# Patient Record
Sex: MALE | Race: BLACK OR AFRICAN AMERICAN
[De-identification: names, ages, dates, MRNs, and addresses within clinical notes are randomized per-mention and may not be internally consistent; named-entity substitution may affect disease eponyms.]

---

## 2020-09-09 ENCOUNTER — HOSPITAL ENCOUNTER (INPATIENT)
Dept: HOSPITAL 74 - YASAS | Age: 35
LOS: 2 days | Discharge: LEFT BEFORE DISCHARGE | DRG: 770 | End: 2020-09-11
Attending: ALLERGY & IMMUNOLOGY | Admitting: ALLERGY & IMMUNOLOGY
Payer: COMMERCIAL

## 2020-09-09 VITALS — BODY MASS INDEX: 28.7 KG/M2

## 2020-09-09 DIAGNOSIS — Z59.0: ICD-10-CM

## 2020-09-09 DIAGNOSIS — F11.23: ICD-10-CM

## 2020-09-09 DIAGNOSIS — F10.230: Primary | ICD-10-CM

## 2020-09-09 DIAGNOSIS — F12.20: ICD-10-CM

## 2020-09-09 DIAGNOSIS — F17.210: ICD-10-CM

## 2020-09-09 LAB
ALBUMIN SERPL-MCNC: 3.6 G/DL (ref 3.4–5)
ALP SERPL-CCNC: 73 U/L (ref 45–117)
ALT SERPL-CCNC: 22 U/L (ref 13–61)
ANION GAP SERPL CALC-SCNC: 7 MMOL/L (ref 8–16)
AST SERPL-CCNC: 16 U/L (ref 15–37)
BILIRUB SERPL-MCNC: 0.4 MG/DL (ref 0.2–1)
BUN SERPL-MCNC: 8.1 MG/DL (ref 7–18)
CALCIUM SERPL-MCNC: 8.8 MG/DL (ref 8.5–10.1)
CHLORIDE SERPL-SCNC: 104 MMOL/L (ref 98–107)
CO2 SERPL-SCNC: 32 MMOL/L (ref 21–32)
CREAT SERPL-MCNC: 0.8 MG/DL (ref 0.55–1.3)
DEPRECATED RDW RBC AUTO: 12.4 % (ref 11.9–15.9)
GLUCOSE SERPL-MCNC: 74 MG/DL (ref 74–106)
HCT VFR BLD CALC: 35.7 % (ref 35.4–49)
HGB BLD-MCNC: 11.8 GM/DL (ref 11.7–16.9)
MCH RBC QN AUTO: 29.4 PG (ref 25.7–33.7)
MCHC RBC AUTO-ENTMCNC: 33.1 G/DL (ref 32–35.9)
MCV RBC: 88.8 FL (ref 80–96)
PLATELET # BLD AUTO: 180 K/MM3 (ref 134–434)
PMV BLD: 10.5 FL (ref 7.5–11.1)
POTASSIUM SERPLBLD-SCNC: 3.6 MMOL/L (ref 3.5–5.1)
PROT SERPL-MCNC: 6.6 G/DL (ref 6.4–8.2)
RBC # BLD AUTO: 4.03 M/MM3 (ref 4–5.6)
SODIUM SERPL-SCNC: 142 MMOL/L (ref 136–145)
WBC # BLD AUTO: 4.1 K/MM3 (ref 4–10)

## 2020-09-09 PROCEDURE — U0003 INFECTIOUS AGENT DETECTION BY NUCLEIC ACID (DNA OR RNA); SEVERE ACUTE RESPIRATORY SYNDROME CORONAVIRUS 2 (SARS-COV-2) (CORONAVIRUS DISEASE [COVID-19]), AMPLIFIED PROBE TECHNIQUE, MAKING USE OF HIGH THROUGHPUT TECHNOLOGIES AS DESCRIBED BY CMS-2020-01-R: HCPCS

## 2020-09-09 PROCEDURE — HZ2ZZZZ DETOXIFICATION SERVICES FOR SUBSTANCE ABUSE TREATMENT: ICD-10-PCS | Performed by: ALLERGY & IMMUNOLOGY

## 2020-09-09 RX ADMIN — Medication SCH MG: at 22:32

## 2020-09-09 RX ADMIN — HYDROXYZINE PAMOATE SCH: 25 CAPSULE ORAL at 18:02

## 2020-09-09 RX ADMIN — HYDROXYZINE PAMOATE SCH MG: 25 CAPSULE ORAL at 15:45

## 2020-09-09 RX ADMIN — Medication SCH MG: at 22:33

## 2020-09-09 RX ADMIN — HYDROXYZINE PAMOATE SCH MG: 25 CAPSULE ORAL at 22:33

## 2020-09-09 RX ADMIN — NICOTINE SCH: 14 PATCH, EXTENDED RELEASE TRANSDERMAL at 15:45

## 2020-09-09 SDOH — ECONOMIC STABILITY - HOUSING INSECURITY: HOMELESSNESS: Z59.0

## 2020-09-09 NOTE — BHS.RME
Substance Use & Tx History





- Substance Use History


  ** Alcohol


Substance amount: 1 pint vodka 


Frequency of use: Less than 3 times per week


Substance route: Oral


Date of Last Use: 20





  ** Heroin


Substance amount: 4 bags


Frequency of use: Daily


Substance route: Inhalation (ex: sniffing or snorting)


Date of Last Use: 20





  ** Marijuana/Hashish


Substance amount: 1 joint 


Frequency of use: Daily


Substance route: Smoking


Date of Last Use: 20





  ** Nicotine


Substance amount: 1/2 pack


Frequency of use: Daily


Substance route: Smoking


Date of Last Use: 20





Physical/Psych/Mental Status





- Behavior


General Behavior: Increased activity (restlessness, agitation)


Eye Contact: Normal





- Cooperativeness


Cooperativeness: Cooperative





- Thinking


Thought Processes: Tight, Logical, Goal Directed





- Physical Health Problems


Is patient presently having any pain?: No


Does patient presently have any injuries (include location): No


Does patient currently have a fever: No


Is patient pregnant: No





COWS





- Scale


Resting Pulse: 0= [place] 80 or Below


Sweatin= Chills/Flushing


Restless Observation: 1= Difficult to Sit Still


Pupil Size: 1= Pupils >than Normal


Bone or Joint Aches: 2= Severe Diffuse Aches


Runny Nose/ Eye Tearin= Runny Nose/Eyes


GI Upset > 30mins: 1= Stomach Cramp


Tremor Observation: 1= Tremor Felt, Not Seen


Yawning Observation: 0= None


Anxiety or Irritability: 1=Feels Anxious/Irritable


Goose Flesh Skin: 3=Piloerection


COWS Score: 13





CIWA


Nausea/Vomitin-No Nausea/No Vomiting


Muscle Tremors: 3


Anxiety: 3


Agitation: 3


Paroxysmal Sweats: 4-Forehead w/Sweat Beads


Orientation: 0-Oriented


Tacttile Disturbances: 0-None


Auditory Disturbances: 0-None


Visual Disturbances: 0-None


Headache: 0-None Present


CIWA-Ar Total Score: 13

## 2020-09-09 NOTE — HP
COWS





- Scale


Resting Pulse: 0= [place] 80 or Below


Sweatin= Chills/Flushing


Restless Observation: 1= Difficult to Sit Still


Pupil Size: 1= Pupils >than Normal


Bone or Joint Aches: 2= Severe Diffuse Aches


Runny Nose/ Eye Tearin= Runny Nose/Eyes


GI Upset > 30mins: 1= Stomach Cramp


Tremor Observation: 1= Tremor Felt, Not Seen


Yawning Observation: 0= None


Anxiety or Irritability: 1=Feels Anxious/Irritable


Goose Flesh Skin: 3=Piloerection


COWS Score: 13





CIWA Score


Nausea/Vomitin-No Nausea/No Vomiting


Muscle Tremors: 3


Anxiety: 3


Agitation: 3


Paroxysmal Sweats: 4-Forehead w/Sweat Beads


Orientation: 0-Oriented


Tacttile Disturbances: 0-None


Auditory Disturbances: 0-None


Visual Disturbances: 0-None


Headache: 0-None Present


CIWA-Ar Total Score: 13





- Admission Criteria


[facility] Guidelines: Admission for Medically Managed Detox: 


Requires at least one of the followin. CIWA greater than 12


2. Seizures within the past 24 hours


3. Delirium tremens within the past 24 hours


4. Hallucinations within the past 24 hours


5. Acute intervention needed for co  occurring medical disorder


6. Acute intervention needed for co  occurring psychiatric disorder


7. Severe withdrawal that cannot be handled at a lower level of care (continued


    vomiting, continued diarrhea, abnormal vital signs) requiring intravenous


    medication and/or fluids


8. Pregnancy








Admitting History and Physical





- Admission


Chief Complaint: Mr. Tatum presents to [facility] stating he is here "to get 

clean".  He requests admission to detox for alcohol and heroin use.


History of Present Illness: 





Mr. Tatum presents to [facility] stating he is here "to get clean".  He requests

admission to detox for alcohol and heroin use. 


He was last here for a 2 day detox in 2017.





PMH: eczema


PSH/Psych/Legal: none


SOC: homeless, with friends








Substance Use History


  ** Alcohol


Substance amount: 1 pint vodka 


Frequency of use: Less than 3 times per week


Substance route: Oral


Date of Last Use: 20


Began age 10 y.


No deizures, no blackouts, no eyeopener





  ** Heroin


Substance amount: 4 bags


Frequency of use: Daily


Substance route: Inhalation (ex: sniffing or snorting)


Date of Last Use: 20


Began at age 29 y


No OD


No Narcan at home





  ** Marijuana/Hashish


Substance amount: 1 joint 


Frequency of use: Daily


Substance route: Smoking


Date of Last Use: 20


Began age 9 y





  ** Nicotine


Substance amount: 1/2 pack


Frequency of use: Daily


Substance route: Smoking


Date of Last Use: 20


Began age 9 yo





Benzo: denies


Methadone: buys on street


History Source: Patient


Limitations to Obtaining History: No Limitations





- Smoking History


Smoking history: Current every day smoker


Have you smoked in the past 12 months: Yes


Aproximately how many cigarettes per day: 20





- Alcohol/Substance Use


Hx Alcohol Use: No





- Social History


Usual Living Arrangement: Yes: Other (homeless, with friends)





Admission [facility]


Allergies/Adverse Reactions: 


                                    Allergies











Allergy/AdvReac Type Severity Reaction Status Date / Time


 


No Known Allergies Allergy   Verified 17 14:28











Exam Limitations: No Limitations





- Ebola screening


Have you traveled outside of the country in the last 21 days: No


Have you been sick,other than usual withdrawal symptoms: No


Do you have a fever: No





- Review of Systems


Constitutional: No Symptoms Reported


EENT: reports: Nose Congestion


Respiratory: reports: No Symptoms reported


Cardiac: reports: No Symptoms Reported


GI: reports: Nausea


: reports: No Symptoms Reported


Musculoskeletal: reports: Back Pain


Integumentary: reports: No Symptoms Reported


Neuro: reports: Tremors


Hematology: reports: No Symptoms Reported


Psychiatric: reports: Anxious





Patient History





- Patient Medical History


Hx Anemia: No


Hx Asthma: No


Hx Chronic Obstructive Pulmonary Disease (COPD): No


Hx Cancer: No


Hx Cardiac Disorders: No


Hx Congestive Heart Failure: No


Hx Hypertension: No


Hx Hypercholesterolemia: No


Hx Pacemaker: No


HX Cerebrovascular Accident: No


Hx Seizures: No


Hx Dementia: No


Hx Diabetes: No


Hx Gastrointestinal Disorders: No


Hx Liver Disease: No


Hx Genitourinary Disorders: No


Hx Sexually Transmitted Disorders: No


Hx Renal Disease (ESRD): No


Hx Thyroid Disease: No


Hx Human Immunodeficiency Virus (HIV): No


Hx Hepatitis C: No


Hx Depression: Yes


Hx Suicide Attempt: No


Hx Bipolar Disorder: No


Hx Schizophrenia: No





- Patient Surgical History


Past Surgical History: No


Anesthesia Reaction: No





- Smoking Cessation


Smoking history: Current every day smoker


Aproximately how many cigarettes per day: 10


Hx Chewing Tobacco Use: No


Initiated information on smoking cessation: Yes


'Breaking Loose' booklet given: 20





Admission Physical Exam [facility]





- Physical


General Appearance: Yes: No Apparent Distress, Nourished, Appropriately Dressed


HEENTM: Yes: EOMI, Hearing grossly Normal, Normocephalic, Normal Voice


Respiratory: Yes: Lungs Clear, No Respiratory Distress, No Accessory Muscle Use


Neck: Yes: Within Normal Limits, Supple


Breast: Yes: Breast Exam Deferred


Cardiology: Yes: Regular Rhythm, Regular Rate, S1, S2


Abdominal: Yes: Normal Bowel Sounds, Non Tender, Flat, Soft


Genitourinary: Yes: Other (deferred)


Back: Yes: Normal Inspection


Musculoskeletal: Yes: full range of Motion


Extremities: Yes: Normal Inspection, Non-Tender


Neurological: Yes: Alert, Normal Response


Integumentary: Yes: Within Normal Limits





- Diagnostic


(1) Alcohol dependence with withdrawal, uncomplicated


Current Visit: Yes   Status: Acute   





(2) Cannabis abuse


Current Visit: Yes   Status: Acute   





(3) Nicotine dependence


Current Visit: Yes   Status: Acute   


Qualifiers: 


   Nicotine product type: cigarettes   Substance use status: uncomplicated   

Qualified Code(s): F17.210 - Nicotine dependence, cigarettes, uncomplicated   





(4) Opioid dependence with withdrawal


Current Visit: Yes   Status: Acute   





Cleared for Admission [facility]





- Detox or Rehab


[facility] Level of Care: Medically Managed


Detox Regimen/Protocol: Methadone/Librium





Breathalyzer





- Breathalyzer


Breathalyzer: 0





Urine Drug Screen





- Test Device


Lot number: [id number]


Expiration date: 22





- Control


Is test valid?: Yes





- Results


Drug screen NEGATIVE: No


Urine drug screen results: THC-Marijuana, FEN-Fentanyl, MOP-Opiates, MTD-

Methadone, BZO-Benzodiazepines





Inpatient Rehab Admission





- Rehab Decision to Admit


Inpatient rehab admission?: No

## 2020-09-10 RX ADMIN — Medication SCH: at 23:16

## 2020-09-10 RX ADMIN — Medication SCH: at 22:26

## 2020-09-10 RX ADMIN — HYDROXYZINE PAMOATE SCH: 25 CAPSULE ORAL at 06:18

## 2020-09-10 RX ADMIN — HYDROXYZINE PAMOATE SCH: 25 CAPSULE ORAL at 14:51

## 2020-09-10 RX ADMIN — HYDROXYZINE PAMOATE SCH: 25 CAPSULE ORAL at 10:39

## 2020-09-10 RX ADMIN — HYDROXYZINE PAMOATE SCH MG: 25 CAPSULE ORAL at 17:34

## 2020-09-10 RX ADMIN — Medication SCH APPLIC: at 17:36

## 2020-09-10 RX ADMIN — Medication SCH TAB: at 10:38

## 2020-09-10 RX ADMIN — HYDROXYZINE PAMOATE SCH: 25 CAPSULE ORAL at 22:26

## 2020-09-10 RX ADMIN — NICOTINE SCH: 14 PATCH, EXTENDED RELEASE TRANSDERMAL at 10:39

## 2020-09-10 NOTE — PN
S CIWA





- CIWA Score


Nausea/Vomitin-Mild Nausea/No Vomiting


Muscle Tremors: 2


Anxiety: 2


Agitation: 2


Paroxysmal Sweats: 1-Minimal Palms Moist


Orientation: 0-Oriented


Tacttile Disturbances: 1-Very Mild Itch/Numbness


Auditory Disturbances: 0-None


Visual Disturbances: 0-None


Headache: 2-Mild


CIWA-Ar Total Score: 11





BHS COWS





- Scale


Resting Pulse: 0= [place] 80 or Below


Sweatin= No chills or Flushing


Restless Observation: 0= Sits Still


Pupil Size: 1= Pupils >than Normal


Bone or Joint Aches: 2= Severe Diffuse Aches


Runny Nose/ Eye Tearin= Runny Nose/Eyes


GI Upset > 30mins: 2= Nausea/Diarrhea


Tremor Observation of Outstretched Hands: 2= Slight Tremor Visible


Yawning Observation: 1= 1-2x During Session


Anxiety or Irritability: 2=Irritable/Anxious


Goose Flesh Skin: 0=Smooth Skin


COWS Score: 12





BHS Progress Note (SOAP)


Subjective: 





alert,irritable,anxious,interrupted sleep,tremor,aching pain body and 

back,nausea


Objective: 





09/10/20 12:45


                                   Vital Signs











Temperature  97.8 F   09/10/20 08:46


 


Pulse Rate  54 L  09/10/20 08:46


 


Respiratory Rate  16   09/10/20 08:46


 


Blood Pressure  133/79   09/10/20 08:46


 


O2 Sat by Pulse Oximetry (%)  99   09/10/20 06:20








                             Laboratory Last Values











WBC  4.1 K/mm3 (4.0-10.0)   20  12:50    


 


RBC  4.03 M/mm3 (4.00-5.60)   20  12:50    


 


Hgb  11.8 GM/dL (11.7-16.9)   20  12:50    


 


Hct  35.7 % (35.4-49)  D 20  12:50    


 


MCV  88.8 fl (80-96)   20  12:50    


 


MCH  29.4 pg (25.7-33.7)   20  12:50    


 


MCHC  33.1 g/dl (32.0-35.9)   20  12:50    


 


RDW  12.4 % (11.9-15.9)   20  12:50    


 


Plt Count  180 K/MM3 (134-434)  D 20  12:50    


 


MPV  10.5 fl (7.5-11.1)   20  12:50    


 


Sodium  142 mmol/L (136-145)   20  12:50    


 


Potassium  3.6 mmol/L (3.5-5.1)   20  12:50    


 


Chloride  104 mmol/L ()   20  12:50    


 


Carbon Dioxide  32 mmol/L (21-32)   20  12:50    


 


Anion Gap  7 MMOL/L (8-16)  L  20  12:50    


 


BUN  8.1 mg/dL (7-18)   20  12:50    


 


Creatinine  0.8 mg/dL (0.55-1.3)   20  12:50    


 


Est GFR (CKD-EPI)AfAm  134.14   20  12:50    


 


Est GFR (CKD-EPI)NonAf  115.74   20  12:50    


 


Random Glucose  74 mg/dL ()   20  12:50    


 


Calcium  8.8 mg/dL (8.5-10.1)   20  12:50    


 


Total Bilirubin  0.4 mg/dL (0.2-1)   20  12:50    


 


AST  16 U/L (15-37)   20  12:50    


 


ALT  22 U/L (13-61)   20  12:50    


 


Alkaline Phosphatase  73 U/L ()   20  12:50    


 


Total Protein  6.6 g/dl (6.4-8.2)   20  12:50    


 


Albumin  3.6 g/dl (3.4-5.0)   20  12:50    


 


Syphilis Serology  Non-reactive  (NONREACTIVE)   20  12:50    











Assessment: 





09/10/20 12:46


withdrawal symptom


Plan: 





continue detox methadone and librium regimen,fluid

## 2020-09-11 VITALS — SYSTOLIC BLOOD PRESSURE: 122 MMHG | DIASTOLIC BLOOD PRESSURE: 76 MMHG | HEART RATE: 49 BPM | TEMPERATURE: 97.3 F

## 2020-09-11 RX ADMIN — Medication SCH: at 12:17

## 2020-09-11 RX ADMIN — HYDROXYZINE PAMOATE SCH: 25 CAPSULE ORAL at 08:14

## 2020-09-11 RX ADMIN — Medication SCH TAB: at 10:17

## 2020-09-11 RX ADMIN — HYDROXYZINE PAMOATE SCH: 25 CAPSULE ORAL at 14:17

## 2020-09-11 RX ADMIN — Medication SCH: at 08:15

## 2020-09-11 RX ADMIN — NICOTINE SCH: 14 PATCH, EXTENDED RELEASE TRANSDERMAL at 10:17

## 2020-09-11 RX ADMIN — HYDROXYZINE PAMOATE SCH: 25 CAPSULE ORAL at 10:17

## 2020-09-11 NOTE — DS
BHS Detox Discharge Summary


Admission Date: 


09/09/20





Discharge Date: 09/11/20





- History


Additional Comments: 





Patient left against medical advice. He had left prior to assessment and as per 

the nurse he refused to sign the AMA form


Pertinent Past History: 





Opioid dependence


Alcohol dependence


Nicotine dependence


Cannabis dependence





- Physical Exam Results


Vital Signs: 


                                   Vital Signs











Temperature  97.3 F L  09/11/20 12:40


 


Pulse Rate  49 L  09/11/20 12:40


 


Respiratory Rate  18   09/11/20 12:40


 


Blood Pressure  122/76   09/11/20 12:40


 


O2 Sat by Pulse Oximetry (%)  97   09/11/20 12:40








                             Laboratory Last Values











WBC  4.1 K/mm3 (4.0-10.0)   09/09/20  12:50    


 


RBC  4.03 M/mm3 (4.00-5.60)   09/09/20  12:50    


 


Hgb  11.8 GM/dL (11.7-16.9)   09/09/20  12:50    


 


Hct  35.7 % (35.4-49)  D 09/09/20  12:50    


 


MCV  88.8 fl (80-96)   09/09/20  12:50    


 


MCH  29.4 pg (25.7-33.7)   09/09/20  12:50    


 


MCHC  33.1 g/dl (32.0-35.9)   09/09/20  12:50    


 


RDW  12.4 % (11.9-15.9)   09/09/20  12:50    


 


Plt Count  180 K/MM3 (134-434)  D 09/09/20  12:50    


 


MPV  10.5 fl (7.5-11.1)   09/09/20  12:50    


 


Sodium  142 mmol/L (136-145)   09/09/20  12:50    


 


Potassium  3.6 mmol/L (3.5-5.1)   09/09/20  12:50    


 


Chloride  104 mmol/L ()   09/09/20  12:50    


 


Carbon Dioxide  32 mmol/L (21-32)   09/09/20  12:50    


 


Anion Gap  7 MMOL/L (8-16)  L  09/09/20  12:50    


 


BUN  8.1 mg/dL (7-18)   09/09/20  12:50    


 


Creatinine  0.8 mg/dL (0.55-1.3)   09/09/20  12:50    


 


Est GFR (CKD-EPI)AfAm  134.14   09/09/20  12:50    


 


Est GFR (CKD-EPI)NonAf  115.74   09/09/20  12:50    


 


Random Glucose  74 mg/dL ()   09/09/20  12:50    


 


Calcium  8.8 mg/dL (8.5-10.1)   09/09/20  12:50    


 


Total Bilirubin  0.4 mg/dL (0.2-1)   09/09/20  12:50    


 


AST  16 U/L (15-37)   09/09/20  12:50    


 


ALT  22 U/L (13-61)   09/09/20  12:50    


 


Alkaline Phosphatase  73 U/L ()   09/09/20  12:50    


 


Total Protein  6.6 g/dl (6.4-8.2)   09/09/20  12:50    


 


Albumin  3.6 g/dl (3.4-5.0)   09/09/20  12:50    


 


Syphilis Serology  Non-reactive  (NONREACTIVE)   09/09/20  12:50    


 


COVID-19 (LEXI)  Not detected  (Not Detected)   09/09/20  15:00    











Pertinent Admission Physical Exam Findings: 





Opioid withdrawal symptoms


Alcohol withdrawal  symptoms





- Medication


Discharge Medications: 


Ambulatory Orders





NK [No Known Home Medication]  11/17/17 











- Diagnosis


(1) Alcohol dependence with withdrawal, uncomplicated


Current Visit: Yes   Status: Chronic   





(2) Cannabis abuse


Current Visit: Yes   Status: Chronic   





(3) Nicotine dependence


Current Visit: Yes   Status: Chronic   


Qualifiers: 


   Nicotine product type: cigarettes   Substance use status: uncomplicated   

Qualified Code(s): F17.210 - Nicotine dependence, cigarettes, uncomplicated   





(4) Opioid dependence with withdrawal


Current Visit: Yes   Status: Chronic   





- AMA


Did Patient Leave Against Medical Advice: Yes

## 2020-09-11 NOTE — PN
[facility] CIWA





- CIWA Score


Nausea/Vomitin-Mild Nausea/No Vomiting


Muscle Tremors: 2


Anxiety: 2


Agitation: 2


Paroxysmal Sweats: No Perspiration


Orientation: 0-Oriented


Tacttile Disturbances: 1-Very Mild Itch/Numbness


Auditory Disturbances: 0-None


Visual Disturbances: 0-None


Headache: 1-Very Mild


CIWA-Ar Total Score: 9





BHS COWS





- Scale


Resting Pulse: 0= [place] 80 or Below


Sweatin= No chills or Flushing


Restless Observation: 0= Sits Still


Pupil Size: 1= Pupils >than Normal


Bone or Joint Aches: 2= Severe Diffuse Aches


Runny Nose/ Eye Tearin= Nasal Congestion


GI Upset > 30mins: 1= Stomach Cramp


Tremor Observation of Outstretched Hands: 2= Slight Tremor Visible


Yawning Observation: 1= 1-2x During Session


Anxiety or Irritability: 2=Irritable/Anxious


Goose Flesh Skin: 0=Smooth Skin


COWS Score: 10





BHS Progress Note (SOAP)


Subjective: 





alert,irritable,anxious,interrupted sleep,tremor,nausea


Objective: 





20 14:46


                                   Vital Signs











Temperature  97.3 F L  20 12:40


 


Pulse Rate  49 L  20 12:40


 


Respiratory Rate  18   20 12:40


 


Blood Pressure  122/76   20 12:40


 


O2 Sat by Pulse Oximetry (%)  97   20 12:40











20 14:46


                             Laboratory Last Values











WBC  4.1 K/mm3 (4.0-10.0)   20  12:50    


 


RBC  4.03 M/mm3 (4.00-5.60)   20  12:50    


 


Hgb  11.8 GM/dL (11.7-16.9)   20  12:50    


 


Hct  35.7 % (35.4-49)  D 20  12:50    


 


MCV  88.8 fl (80-96)   20  12:50    


 


MCH  29.4 pg (25.7-33.7)   20  12:50    


 


MCHC  33.1 g/dl (32.0-35.9)   20  12:50    


 


RDW  12.4 % (11.9-15.9)   20  12:50    


 


Plt Count  180 K/MM3 (134-434)  D 20  12:50    


 


MPV  10.5 fl (7.5-11.1)   20  12:50    


 


Sodium  142 mmol/L (136-145)   20  12:50    


 


Potassium  3.6 mmol/L (3.5-5.1)   20  12:50    


 


Chloride  104 mmol/L ()   20  12:50    


 


Carbon Dioxide  32 mmol/L (21-32)   20  12:50    


 


Anion Gap  7 MMOL/L (8-16)  L  20  12:50    


 


BUN  8.1 mg/dL (7-18)   20  12:50    


 


Creatinine  0.8 mg/dL (0.55-1.3)   20  12:50    


 


Est GFR (CKD-EPI)AfAm  134.14   20  12:50    


 


Est GFR (CKD-EPI)NonAf  115.74   20  12:50    


 


Random Glucose  74 mg/dL ()   20  12:50    


 


Calcium  8.8 mg/dL (8.5-10.1)   20  12:50    


 


Total Bilirubin  0.4 mg/dL (0.2-1)   20  12:50    


 


AST  16 U/L (15-37)   20  12:50    


 


ALT  22 U/L (13-61)   20  12:50    


 


Alkaline Phosphatase  73 U/L ()   20  12:50    


 


Total Protein  6.6 g/dl (6.4-8.2)   20  12:50    


 


Albumin  3.6 g/dl (3.4-5.0)   20  12:50    


 


Syphilis Serology  Non-reactive  (NONREACTIVE)   20  12:50    


 


COVID-19 (LEXI)  Not detected  (Not Detected)   20  15:00    











Assessment: 





20 14:59


withdrawal symptom


Plan: 





continue detox methadone and librium regimen

## 2022-09-26 ENCOUNTER — HOSPITAL ENCOUNTER (EMERGENCY)
Facility: HOSPITAL | Age: 37
Discharge: HOME | End: 2022-09-26
Attending: EMERGENCY MEDICINE
Payer: MEDICAID

## 2022-09-26 VITALS
DIASTOLIC BLOOD PRESSURE: 68 MMHG | TEMPERATURE: 97.9 F | SYSTOLIC BLOOD PRESSURE: 132 MMHG | OXYGEN SATURATION: 100 % | RESPIRATION RATE: 18 BRPM | HEART RATE: 72 BPM

## 2022-09-26 DIAGNOSIS — F11.93 OPIATE WITHDRAWAL (HCC): Primary | ICD-10-CM

## 2022-09-26 LAB
ALBUMIN SERPL BCP-MCNC: 4.4 G/DL (ref 3.5–5)
ALP SERPL-CCNC: 62 U/L (ref 46–116)
ALT SERPL W P-5'-P-CCNC: 22 U/L (ref 12–78)
AMPHETAMINES SERPL QL SCN: NEGATIVE
ANION GAP SERPL CALCULATED.3IONS-SCNC: 9 MMOL/L (ref 4–13)
AST SERPL W P-5'-P-CCNC: 14 U/L (ref 5–45)
BARBITURATES UR QL: NEGATIVE
BASOPHILS # BLD AUTO: 0.02 THOUSANDS/ΜL (ref 0–0.1)
BASOPHILS NFR BLD AUTO: 0 % (ref 0–1)
BENZODIAZ UR QL: NEGATIVE
BILIRUB SERPL-MCNC: 0.92 MG/DL (ref 0.2–1)
BUN SERPL-MCNC: 7 MG/DL (ref 5–25)
CALCIUM SERPL-MCNC: 9.3 MG/DL (ref 8.3–10.1)
CHLORIDE SERPL-SCNC: 105 MMOL/L (ref 96–108)
CO2 SERPL-SCNC: 29 MMOL/L (ref 21–32)
COCAINE UR QL: NEGATIVE
CREAT SERPL-MCNC: 0.95 MG/DL (ref 0.6–1.3)
EOSINOPHIL # BLD AUTO: 0.04 THOUSAND/ΜL (ref 0–0.61)
EOSINOPHIL NFR BLD AUTO: 1 % (ref 0–6)
ERYTHROCYTE [DISTWIDTH] IN BLOOD BY AUTOMATED COUNT: 12 % (ref 11.6–15.1)
GFR SERPL CREATININE-BSD FRML MDRD: 101 ML/MIN/1.73SQ M
GLUCOSE SERPL-MCNC: 118 MG/DL (ref 65–140)
HCT VFR BLD AUTO: 40.9 % (ref 36.5–49.3)
HGB BLD-MCNC: 13.6 G/DL (ref 12–17)
IMM GRANULOCYTES # BLD AUTO: 0.01 THOUSAND/UL (ref 0–0.2)
IMM GRANULOCYTES NFR BLD AUTO: 0 % (ref 0–2)
LYMPHOCYTES # BLD AUTO: 1.26 THOUSANDS/ΜL (ref 0.6–4.47)
LYMPHOCYTES NFR BLD AUTO: 19 % (ref 14–44)
MAGNESIUM SERPL-MCNC: 2 MG/DL (ref 1.6–2.6)
MCH RBC QN AUTO: 28.8 PG (ref 26.8–34.3)
MCHC RBC AUTO-ENTMCNC: 33.3 G/DL (ref 31.4–37.4)
MCV RBC AUTO: 87 FL (ref 82–98)
METHADONE UR QL: POSITIVE
MONOCYTES # BLD AUTO: 0.61 THOUSAND/ΜL (ref 0.17–1.22)
MONOCYTES NFR BLD AUTO: 9 % (ref 4–12)
NEUTROPHILS # BLD AUTO: 4.66 THOUSANDS/ΜL (ref 1.85–7.62)
NEUTS SEG NFR BLD AUTO: 71 % (ref 43–75)
NRBC BLD AUTO-RTO: 0 /100 WBCS
OPIATES UR QL SCN: POSITIVE
OXYCODONE+OXYMORPHONE UR QL SCN: NEGATIVE
PCP UR QL: NEGATIVE
PLATELET # BLD AUTO: 264 THOUSANDS/UL (ref 149–390)
PMV BLD AUTO: 10.9 FL (ref 8.9–12.7)
POTASSIUM SERPL-SCNC: 3.1 MMOL/L (ref 3.5–5.3)
PROT SERPL-MCNC: 7.8 G/DL (ref 6.4–8.4)
RBC # BLD AUTO: 4.73 MILLION/UL (ref 3.88–5.62)
SODIUM SERPL-SCNC: 143 MMOL/L (ref 135–147)
THC UR QL: POSITIVE
WBC # BLD AUTO: 6.6 THOUSAND/UL (ref 4.31–10.16)

## 2022-09-26 PROCEDURE — 80307 DRUG TEST PRSMV CHEM ANLYZR: CPT | Performed by: PHYSICIAN ASSISTANT

## 2022-09-26 PROCEDURE — 99285 EMERGENCY DEPT VISIT HI MDM: CPT

## 2022-09-26 PROCEDURE — 80053 COMPREHEN METABOLIC PANEL: CPT | Performed by: PHYSICIAN ASSISTANT

## 2022-09-26 PROCEDURE — 85025 COMPLETE CBC W/AUTO DIFF WBC: CPT | Performed by: PHYSICIAN ASSISTANT

## 2022-09-26 PROCEDURE — 96361 HYDRATE IV INFUSION ADD-ON: CPT

## 2022-09-26 PROCEDURE — 96374 THER/PROPH/DIAG INJ IV PUSH: CPT

## 2022-09-26 PROCEDURE — 96375 TX/PRO/DX INJ NEW DRUG ADDON: CPT

## 2022-09-26 PROCEDURE — 96372 THER/PROPH/DIAG INJ SC/IM: CPT

## 2022-09-26 PROCEDURE — 99285 EMERGENCY DEPT VISIT HI MDM: CPT | Performed by: PHYSICIAN ASSISTANT

## 2022-09-26 PROCEDURE — 36415 COLL VENOUS BLD VENIPUNCTURE: CPT | Performed by: PHYSICIAN ASSISTANT

## 2022-09-26 PROCEDURE — 83735 ASSAY OF MAGNESIUM: CPT | Performed by: PHYSICIAN ASSISTANT

## 2022-09-26 PROCEDURE — 96376 TX/PRO/DX INJ SAME DRUG ADON: CPT

## 2022-09-26 RX ORDER — BENZTROPINE MESYLATE 1 MG/ML
1 INJECTION INTRAMUSCULAR; INTRAVENOUS ONCE
Status: COMPLETED | OUTPATIENT
Start: 2022-09-26 | End: 2022-09-26

## 2022-09-26 RX ORDER — ONDANSETRON 4 MG/1
4 TABLET, ORALLY DISINTEGRATING ORAL ONCE
Status: COMPLETED | OUTPATIENT
Start: 2022-09-26 | End: 2022-09-26

## 2022-09-26 RX ORDER — ONDANSETRON 2 MG/ML
4 INJECTION INTRAMUSCULAR; INTRAVENOUS ONCE
Status: COMPLETED | OUTPATIENT
Start: 2022-09-26 | End: 2022-09-26

## 2022-09-26 RX ORDER — DIAZEPAM 5 MG/ML
5 INJECTION, SOLUTION INTRAMUSCULAR; INTRAVENOUS ONCE
Status: DISCONTINUED | OUTPATIENT
Start: 2022-09-26 | End: 2022-09-26

## 2022-09-26 RX ORDER — LORAZEPAM 2 MG/ML
1 INJECTION INTRAMUSCULAR ONCE
Status: COMPLETED | OUTPATIENT
Start: 2022-09-26 | End: 2022-09-26

## 2022-09-26 RX ORDER — DIPHENHYDRAMINE HYDROCHLORIDE 50 MG/ML
50 INJECTION INTRAMUSCULAR; INTRAVENOUS ONCE
Status: COMPLETED | OUTPATIENT
Start: 2022-09-26 | End: 2022-09-26

## 2022-09-26 RX ORDER — HALOPERIDOL 5 MG/ML
5 INJECTION INTRAMUSCULAR ONCE
Status: COMPLETED | OUTPATIENT
Start: 2022-09-26 | End: 2022-09-26

## 2022-09-26 RX ADMIN — SODIUM CHLORIDE 1000 ML: 0.9 INJECTION, SOLUTION INTRAVENOUS at 01:32

## 2022-09-26 RX ADMIN — LORAZEPAM 1 MG: 2 INJECTION, SOLUTION INTRAMUSCULAR; INTRAVENOUS at 01:31

## 2022-09-26 RX ADMIN — ONDANSETRON 4 MG: 2 INJECTION INTRAMUSCULAR; INTRAVENOUS at 01:32

## 2022-09-26 RX ADMIN — BENZTROPINE MESYLATE 1 MG: 1 INJECTION INTRAMUSCULAR; INTRAVENOUS at 03:34

## 2022-09-26 RX ADMIN — DIPHENHYDRAMINE HYDROCHLORIDE 50 MG: 50 INJECTION, SOLUTION INTRAMUSCULAR; INTRAVENOUS at 01:32

## 2022-09-26 RX ADMIN — DIPHENHYDRAMINE HYDROCHLORIDE 50 MG: 50 INJECTION, SOLUTION INTRAMUSCULAR; INTRAVENOUS at 03:21

## 2022-09-26 RX ADMIN — HALOPERIDOL LACTATE 5 MG: 5 INJECTION, SOLUTION INTRAMUSCULAR at 01:31

## 2022-09-26 RX ADMIN — ONDANSETRON 4 MG: 4 TABLET, ORALLY DISINTEGRATING ORAL at 00:42

## 2022-09-26 NOTE — ED NOTES
Pt ringing call bell states  my arms are going crazy  Provider to bedside to speak with pt        Johanna Enriquez, RN  09/26/22 [id number]

## 2022-09-26 NOTE — ED NOTES
Pt presents to the ED w/chief complaint of withdrawals from opiates  Pt reports having been at a detox facility for 5 days and it didn't really work out  Pt reports having left [place] to visit family and friends here and to get away from the city "where everything is available " Pt reports experiencing intense wd's and brought self to ED  CW advised pt due to pt having out of state [place], there are no facilities in [place] which would accept pt  CW and pt discussed if Rehab was recommended at prior IP stay and pt stated, "Yes " CW suggested pt contact pt's insurance and ask them to do a search for a facility, perhaps another one if pt did not like the most recent one, and see if there are any available beds  Pt is receptive to this plan  CW provided NP w/updates  Pt denies SI's / HI's and or AVH's  Pt denies any hx of MH  Pt reports solely wd's from opiates at this time      TDS, CW

## 2022-09-26 NOTE — ED NOTES
Patient given discharge paperwork  Patient verbalized understanding  Ambulated out of department with steady gait        Dennis Mcleod RN  09/26/22 [id number]

## 2022-09-26 NOTE — ED PROVIDER NOTES
History  Chief Complaint   Patient presents with    Medical Problem     Pt arrived via ems with c/o withdrawing from opiates, last time used was 9/19  Pt was recently in a detox and left because he was not comfortable  Patient is a 79-year-old male with a past medical history significant narcotic abuse presenting to the emergency department for evaluation of opiate withdrawal   Patient states that he was recently admitted for 5 days at a detox facility in [place] and left against medical advice  He states that he was too close to his home town and the temptation to use narcotics was very high which prompted him to sign out  He states that he uses 1-1/2 bundles of fentanyl every day  He denies intravenous administration, he states that he snorts it  He states that his last use was on September 19th, he has been using fentanyl for at least 5 years per patient  Patient is reporting chills, anxiety, body aches, nausea, vomiting, diarrhea  He denies fevers, chest pain, difficulty breathing, abdominal pain, headache, dizziness, visual disturbance  No other complaints at this time  History provided by:  Patient   used: No    Withdrawal - Drug  Similar prior episodes: yes    Severity:  Moderate  Onset quality:  Gradual  Duration:  7 days  Timing:  Constant  Progression:  Worsening  Chronicity:  Recurrent  Suspected agents: fentanyl  Associated symptoms: nausea and vomiting    Associated symptoms: no abdominal pain, no agitation, no blackouts, no bladder incontinence, no bowel incontinence, no confusion, no hallucinations, no headaches, no loss of consciousness, no palpitations, no seizures, no shortness of breath, no somnolence, no suicidal ideation, no violence and no weakness    Risk factors: addiction treatment        None       Past Medical History:   Diagnosis Date    Addiction to drug [facility])        History reviewed  No pertinent surgical history  History reviewed   No pertinent family history  I have reviewed and agree with the history as documented  E-Cigarette/Vaping     E-Cigarette/Vaping Substances     Social History     Tobacco Use    Smoking status: Current Every Day Smoker    Smokeless tobacco: Never Used   Substance Use Topics    Alcohol use: Never    Drug use: Yes       Review of Systems   Constitutional: Positive for chills  Negative for fever  Respiratory: Negative for shortness of breath  Cardiovascular: Negative for palpitations  Gastrointestinal: Positive for diarrhea, nausea and vomiting  Negative for abdominal pain and bowel incontinence  Genitourinary: Negative for bladder incontinence  Musculoskeletal: Positive for arthralgias and myalgias  Neurological: Negative for seizures, loss of consciousness, weakness and headaches  Psychiatric/Behavioral: Negative for agitation, confusion, hallucinations and suicidal ideas  The patient is nervous/anxious  All other systems reviewed and are negative  Physical Exam  Physical Exam  Vitals reviewed  Constitutional:       General: He is not in acute distress  Appearance: Normal appearance  He is normal weight  He is not ill-appearing, toxic-appearing or diaphoretic  HENT:      Head: Normocephalic and atraumatic  Right Ear: External ear normal       Left Ear: External ear normal       Mouth/Throat:      Mouth: Mucous membranes are moist       Pharynx: Oropharynx is clear  No oropharyngeal exudate or posterior oropharyngeal erythema  Eyes:      General: No scleral icterus  Right eye: No discharge  Left eye: No discharge  Extraocular Movements: Extraocular movements intact  Conjunctiva/sclera: Conjunctivae normal    Cardiovascular:      Rate and Rhythm: Normal rate and regular rhythm  Pulses: Normal pulses  Heart sounds: Normal heart sounds  No murmur heard  No friction rub  No gallop     Pulmonary:      Effort: Pulmonary effort is normal  No respiratory distress  Breath sounds: Normal breath sounds  No stridor  No wheezing, rhonchi or rales  Abdominal:      General: Abdomen is flat  Palpations: Abdomen is soft  Tenderness: There is no abdominal tenderness  There is no guarding or rebound  Musculoskeletal:         General: Normal range of motion  Cervical back: Normal range of motion and neck supple  Right lower leg: No edema  Left lower leg: No edema  Skin:     General: Skin is warm and dry  Capillary Refill: Capillary refill takes less than 2 seconds  Neurological:      General: No focal deficit present  Mental Status: He is alert and oriented to person, place, and time     Psychiatric:         Mood and Affect: Mood normal          Behavior: Behavior normal          Vital Signs  ED Triage Vitals [09/26/22 0013]   Temperature Pulse Respirations Blood Pressure SpO2   97 9 °F (36 6 °C) 72 20 149/96 99 %      Temp Source Heart Rate Source Patient Position - Orthostatic VS BP Location FiO2 (%)   Oral Monitor Sitting Right arm --      Pain Score       --           Vitals:    09/26/22 0013 09/26/22 0030 09/26/22 0300 09/26/22 0430   BP: 149/96 159/86 138/73 132/68   Pulse: 72 58 75 72   Patient Position - Orthostatic VS: Sitting Sitting Sitting Lying         Visual Acuity      ED Medications  Medications   ondansetron (ZOFRAN-ODT) dispersible tablet 4 mg (4 mg Oral Given 9/26/22 0042)   sodium chloride 0 9 % bolus 1,000 mL (0 mL Intravenous Stopped 9/26/22 0232)   ondansetron (ZOFRAN) injection 4 mg (4 mg Intravenous Given 9/26/22 0132)   diphenhydrAMINE (BENADRYL) injection 50 mg (50 mg Intravenous Given 9/26/22 0132)   LORazepam (ATIVAN) injection 1 mg (1 mg Intravenous Given 9/26/22 0131)   haloperidol lactate (HALDOL) injection 5 mg (5 mg Intramuscular Given 9/26/22 0131)   diphenhydrAMINE (BENADRYL) injection 50 mg (50 mg Intravenous Given 9/26/22 0321)   benztropine (COGENTIN) injection 1 mg (1 mg Intramuscular Given 9/26/22 0334)       Diagnostic Studies  Results Reviewed     Procedure Component Value Units Date/Time    Comprehensive metabolic panel [[id number]]  (Abnormal) Collected: 09/26/22 0134    Lab Status: Final result Specimen: Blood from Arm, Left Updated: 09/26/22 0200     Sodium 143 mmol/L      Potassium 3 1 mmol/L      Chloride 105 mmol/L      CO2 29 mmol/L      ANION GAP 9 mmol/L      BUN 7 mg/dL      Creatinine 0 95 mg/dL      Glucose 118 mg/dL      Calcium 9 3 mg/dL      AST 14 U/L      ALT 22 U/L      Alkaline Phosphatase 62 U/L      Total Protein 7 8 g/dL      Albumin 4 4 g/dL      Total Bilirubin 0 92 mg/dL      eGFR 101 ml/min/1 73sq m     Narrative:      [place] guidelines for Chronic Kidney Disease (CKD):     Stage 1 with normal or high GFR (GFR > 90 mL/min/1 73 square meters)    Stage 2 Mild CKD (GFR = 60-89 mL/min/1 73 square meters)    Stage 3A Moderate CKD (GFR = 45-59 mL/min/1 73 square meters)    Stage 3B Moderate CKD (GFR = 30-44 mL/min/1 73 square meters)    Stage 4 Severe CKD (GFR = 15-29 mL/min/1 73 square meters)    Stage 5 End Stage CKD (GFR <15 mL/min/1 73 square meters)  Note: GFR calculation is accurate only with a steady state creatinine    Magnesium [[id number]]  (Normal) Collected: 09/26/22 0134    Lab Status: Final result Specimen: Blood from Arm, Left Updated: 09/26/22 0200     Magnesium 2 0 mg/dL     Rapid drug screen, urine [[id number]]  (Abnormal) Collected: 09/26/22 0145    Lab Status: Final result Specimen: Urine, Clean Catch Updated: 09/26/22 0159     Amph/Meth UR Negative     Barbiturate Ur Negative     Benzodiazepine Urine Negative     Cocaine Urine Negative     Methadone Urine Positive     Opiate Urine Positive     PCP Ur Negative     THC Urine Positive     Oxycodone Urine Negative    Narrative:      Presumptive report  If requested, specimen will be sent to reference lab for confirmation  FOR MEDICAL PURPOSES ONLY     IF CONFIRMATION NEEDED PLEASE CONTACT THE LAB WITHIN 5 DAYS  Drug Screen Cutoff Levels:  AMPHETAMINE/METHAMPHETAMINES  1000 ng/mL  BARBITURATES     200 ng/mL  BENZODIAZEPINES     200 ng/mL  COCAINE      300 ng/mL  METHADONE      300 ng/mL  OPIATES      300 ng/mL  PHENCYCLIDINE     25 ng/mL  THC       50 ng/mL  OXYCODONE      100 ng/mL    CBC and differential [[id number]] Collected: 09/26/22 0134    Lab Status: Final result Specimen: Blood from Arm, Left Updated: 09/26/22 0143     WBC 6 60 Thousand/uL      RBC 4 73 Million/uL      Hemoglobin 13 6 g/dL      Hematocrit 40 9 %      MCV 87 fL      MCH 28 8 pg      MCHC 33 3 g/dL      RDW 12 0 %      MPV 10 9 fL      Platelets 586 Thousands/uL      nRBC 0 /100 WBCs      Neutrophils Relative 71 %      Immat GRANS % 0 %      Lymphocytes Relative 19 %      Monocytes Relative 9 %      Eosinophils Relative 1 %      Basophils Relative 0 %      Neutrophils Absolute 4 66 Thousands/µL      Immature Grans Absolute 0 01 Thousand/uL      Lymphocytes Absolute 1 26 Thousands/µL      Monocytes Absolute 0 61 Thousand/µL      Eosinophils Absolute 0 04 Thousand/µL      Basophils Absolute 0 02 Thousands/µL                  No orders to display              Procedures  Procedures         ED Course                               SBIRT 20yo+    Flowsheet Row Most Recent Value   SBIRT (25 yo +)    In order to provide better care to our patients, we are screening all of our patients for alcohol and drug use  Would it be okay to ask you these screening questions? Yes Filed at: 09/26/2022 0417   Initial Alcohol Screen: US AUDIT-C     1  How often do you have a drink containing alcohol? 0 Filed at: 09/26/2022 0942   2  How many drinks containing alcohol do you have on a typical day you are drinking? 0 Filed at: 09/26/2022 0942   3a  Male UNDER 65: How often do you have five or more drinks on one occasion? 0 Filed at: 09/26/2022 0942   3b  FEMALE Any Age, or MALE 65+:  How often do you have 4 or more drinks on one occassion? 0 Filed at: 09/26/2022 0942   Audit-C Score 0 Filed at: 09/26/2022 6310   CADEN: How many times in the past year have you    Used an illegal drug or used a prescription medication for non-medical reasons? Once or Twice Filed at: 09/26/2022 0660   DAST-10: In the past 12 months       1  Have you used drugs other than those required for medical reasons? 1 Filed at: 09/26/2022 0942   2  Do you use more than one drug at a time? 0 Filed at: 09/26/2022 0942   3  Have you had medical problems as a result of your drug use (e g , memory loss, hepatitis, convulsions, bleeding, etc )? 1 Filed at: 09/26/2022 0942   4  Have you had "blackouts" or "flashbacks" as a result of drug use? YesNo 0 Filed at: 09/26/2022 0942   5  Do you ever feel bad or guilty about your drug use? 0 Filed at: 09/26/2022 0942   6  Does your spouse (or parent) ever complain about your involvement with drugs? 0 Filed at: 09/26/2022 0942   7  Have you neglected your family because of your use of drugs? 0 Filed at: 09/26/2022 0942   8  Have you engaged in illegal activities in order to obtain drugs? 0 Filed at: 09/26/2022 0942   9  Have you ever experienced withdrawal symptoms (felt sick) when you stopped taking drugs? 1 Filed at: 09/26/2022 0942   10  Are you always able to stop using drugs when you want to? 0 Filed at: 09/26/2022 0942   DAST-10 Score 3 Filed at: 09/26/2022 3688                    MDM  Number of Diagnoses or Management Options  Opiate withdrawal ([facility] )  Diagnosis management comments: Patient presenting for evaluation of opiate withdrawal symptoms  Upon arrival, he appears comfortable  He is not any acute distress  Vital signs unremarkable  Withdrawal symptoms adequately controlled with Zofran benzodiazepines, antipsychotics, fluids  Patient was held in the emergency department overnight for crisis evaluation in the morning for warm handoff  Ultimately discharged with outpatient follow-up  Currently in stable condition         Amount and/or Complexity of Data Reviewed  Clinical lab tests: ordered and reviewed  Discuss the patient with other providers: yes    Patient Progress  Patient progress: stable      Disposition  Final diagnoses:   Opiate withdrawal ([address] )     Time reflects when diagnosis was documented in both MDM as applicable and the Disposition within this note     Time User Action Codes Description Comment    9/29/2022  2:56 AM Mindi Guevara Add [F11 23] Opiate withdrawal [facility])       ED Disposition     ED Disposition   Discharge    Condition   Stable    Date/Time   Mon Sep 26, 2022  9:19 AM    Comment   Vivek Burden discharge to home/self care  Follow-up Information    None         There are no discharge medications for this patient  No discharge procedures on file      PDMP Review     None          ED Provider  Electronically Signed by           Siobhan Irwin PA-C  09/29/22 7962

## 2022-09-26 NOTE — ED NOTES
Pt reports he is having a allergic reaction to ativan  When asked what symptoms pt is experiencing, pt reports "my arm is twitching this has happen before " After benadryl was administered pt closed eyes  e   Vital signs stable      Leonid Toussaint RN  09/26/22 [address], RN  09/26/22 0195

## 2022-10-03 ENCOUNTER — HOSPITAL ENCOUNTER (EMERGENCY)
Facility: HOSPITAL | Age: 37
Discharge: HOME | End: 2022-10-03
Attending: EMERGENCY MEDICINE
Payer: MEDICAID

## 2022-10-03 VITALS
OXYGEN SATURATION: 97 % | SYSTOLIC BLOOD PRESSURE: 168 MMHG | RESPIRATION RATE: 18 BRPM | HEART RATE: 67 BPM | DIASTOLIC BLOOD PRESSURE: 87 MMHG | TEMPERATURE: 96.9 F

## 2022-10-03 DIAGNOSIS — F41.9 ANXIETY: ICD-10-CM

## 2022-10-03 DIAGNOSIS — F11.93 OPIOID WITHDRAWAL (HCC): Primary | ICD-10-CM

## 2022-10-03 DIAGNOSIS — F11.90 OPIOID USE DISORDER: ICD-10-CM

## 2022-10-03 PROCEDURE — 99284 EMERGENCY DEPT VISIT MOD MDM: CPT | Performed by: EMERGENCY MEDICINE

## 2022-10-03 PROCEDURE — 99284 EMERGENCY DEPT VISIT MOD MDM: CPT

## 2022-10-03 PROCEDURE — 99242 OFF/OP CONSLTJ NEW/EST SF 20: CPT | Performed by: EMERGENCY MEDICINE

## 2022-10-03 PROCEDURE — 96372 THER/PROPH/DIAG INJ SC/IM: CPT

## 2022-10-03 RX ORDER — BUPRENORPHINE AND NALOXONE 8; 2 MG/1; MG/1
8 FILM, SOLUBLE BUCCAL; SUBLINGUAL 2 TIMES DAILY
Qty: 42 FILM | Refills: 0 | Status: SHIPPED | OUTPATIENT
Start: 2022-10-03 | End: 2022-10-24

## 2022-10-03 RX ORDER — DIAZEPAM 5 MG/1
5 TABLET ORAL ONCE
Status: COMPLETED | OUTPATIENT
Start: 2022-10-03 | End: 2022-10-03

## 2022-10-03 RX ORDER — BUPRENORPHINE AND NALOXONE 8; 2 MG/1; MG/1
8 FILM, SOLUBLE BUCCAL; SUBLINGUAL ONCE
Status: COMPLETED | OUTPATIENT
Start: 2022-10-03 | End: 2022-10-03

## 2022-10-03 RX ORDER — CLONAZEPAM 0.5 MG/1
2 TABLET ORAL ONCE
Status: DISCONTINUED | OUTPATIENT
Start: 2022-10-03 | End: 2022-10-03

## 2022-10-03 RX ORDER — KETOROLAC TROMETHAMINE 30 MG/ML
30 INJECTION, SOLUTION INTRAMUSCULAR; INTRAVENOUS ONCE
Status: COMPLETED | OUTPATIENT
Start: 2022-10-03 | End: 2022-10-03

## 2022-10-03 RX ADMIN — DIAZEPAM 5 MG: 5 TABLET ORAL at 16:44

## 2022-10-03 RX ADMIN — BUPRENORPHINE AND NALOXONE 8 MG: 8; 2 FILM BUCCAL; SUBLINGUAL at 17:17

## 2022-10-03 RX ADMIN — BUPRENORPHINE AND NALOXONE 8 MG: 8; 2 FILM BUCCAL; SUBLINGUAL at 16:22

## 2022-10-03 RX ADMIN — DIAZEPAM 5 MG: 5 TABLET ORAL at 17:41

## 2022-10-03 RX ADMIN — KETOROLAC TROMETHAMINE 30 MG: 30 INJECTION, SOLUTION INTRAMUSCULAR at 17:09

## 2022-10-03 NOTE — ED PROVIDER NOTES
History  Chief Complaint   Patient presents with    Back Pain     Patient stated that he has back pain  Also stating that he is withdrawing from his pain medications  "My anxiety is through the roof, and I just need help"    Anxiety     HPI  38 yo M presents with report of opiate withdrawal  He states that he was admitted to a detox facility in [place] and was prescribed methadone 60 mg  He has not taken this in the past two weeks  He reports body aches, nausea, anxiety  Denies taking any drugs in the past two weeks other than motrin  Prior to methadone patient had been using 1 5 bundles of fentanyl daily and snorting this  None       Past Medical History:   Diagnosis Date    Addiction to drug ([facility] )     Anxiety     Psychiatric disorder        History reviewed  No pertinent surgical history  History reviewed  No pertinent family history  I have reviewed and agree with the history as documented  E-Cigarette/Vaping    E-Cigarette Use Never User      E-Cigarette/Vaping Substances     Social History     Tobacco Use    Smoking status: Current Every Day Smoker     Packs/day: 0 50    Smokeless tobacco: Never Used   Vaping Use    Vaping Use: Never used   Substance Use Topics    Alcohol use: Never    Drug use: Yes     Types: Marijuana, Methamphetamines       Review of Systems   Constitutional: Negative for chills and fever  HENT: Negative for dental problem and ear pain  Eyes: Negative for pain and redness  Respiratory: Negative for cough and shortness of breath  Cardiovascular: Negative for chest pain and palpitations  Gastrointestinal: Positive for nausea  Negative for abdominal pain  Endocrine: Negative for polydipsia and polyphagia  Genitourinary: Negative for dysuria and frequency  Musculoskeletal: Positive for myalgias  Negative for arthralgias and joint swelling  Skin: Negative for color change and rash  Neurological: Negative for dizziness and headaches     Psychiatric/Behavioral: Negative for behavioral problems and confusion  The patient is nervous/anxious  All other systems reviewed and are negative  Physical Exam  Physical Exam  Vitals and nursing note reviewed  Constitutional:       General: He is not in acute distress  Appearance: He is well-developed  He is not diaphoretic  HENT:      Head: Atraumatic  Right Ear: External ear normal       Left Ear: External ear normal       Nose: Nose normal    Eyes:      Conjunctiva/sclera: Conjunctivae normal       Pupils: Pupils are equal, round, and reactive to light  Neck:      Vascular: No JVD  Cardiovascular:      Rate and Rhythm: Normal rate and regular rhythm  Heart sounds: Normal heart sounds  No murmur heard  Pulmonary:      Effort: Pulmonary effort is normal  No respiratory distress  Breath sounds: Normal breath sounds  No wheezing  Abdominal:      General: Bowel sounds are normal  There is no distension  Palpations: Abdomen is soft  Tenderness: There is no abdominal tenderness  Musculoskeletal:         General: Normal range of motion  Cervical back: Normal range of motion and neck supple  Skin:     General: Skin is warm and dry  Capillary Refill: Capillary refill takes less than 2 seconds  Neurological:      Mental Status: He is alert and oriented to person, place, and time  Cranial Nerves: No cranial nerve deficit     Psychiatric:      Comments: +appears anxious         Vital Signs  ED Triage Vitals [10/03/22 1511]   Temperature Pulse Respirations Blood Pressure SpO2   (!) 96 9 °F (36 1 °C) 67 18 168/87 97 %      Temp Source Heart Rate Source Patient Position - Orthostatic VS BP Location FiO2 (%)   Tympanic Monitor Sitting Left arm --      Pain Score       --           Vitals:    10/03/22 1511   BP: 168/87   Pulse: 67   Patient Position - Orthostatic VS: Sitting         Visual Acuity      ED Medications  Medications   buprenorphine-naloxone (Suboxone) film 8 mg (8 mg Sublingual Given 10/3/22 1622)   diazepam (VALIUM) tablet 5 mg (5 mg Oral Given 10/3/22 1644)   ketorolac (TORADOL) injection 30 mg (30 mg Intramuscular Given 10/3/22 1709)   buprenorphine-naloxone (Suboxone) film 8 mg (8 mg Sublingual Given 10/3/22 1717)   diazepam (VALIUM) tablet 5 mg (5 mg Oral Given 10/3/22 1741)       Diagnostic Studies  Results Reviewed     None                 No orders to display              Procedures  Procedures         ED Course                               SBIRT 22yo+    Flowsheet Row Most Recent Value   SBIRT (23 yo +)    In order to provide better care to our patients, we are screening all of our patients for alcohol and drug use  Would it be okay to ask you these screening questions? Yes Filed at: 10/03/2022 1618   Initial Alcohol Screen: US AUDIT-C     1  How often do you have a drink containing alcohol? 0 Filed at: 10/03/2022 1618   2  How many drinks containing alcohol do you have on a typical day you are drinking? 0 Filed at: 10/03/2022 1618   3a  Male UNDER 65: How often do you have five or more drinks on one occasion? 0 Filed at: 10/03/2022 1618   3b  FEMALE Any Age, or MALE 65+: How often do you have 4 or more drinks on one occassion? 0 Filed at: 10/03/2022 1618   Audit-C Score 0 Filed at: 10/03/2022 1618   CADEN: How many times in the past year have you    Used an illegal drug or used a prescription medication for non-medical reasons? Never Filed at: 10/03/2022 1618                    MDM  40 yo M presents with opioid withdrawal  He declines rehab or detox  I consulted toxicology for suboxone therapy, patient will follow up with provided resources by ED crisis, suboxone outpatient prescribed by toxicology     Disposition  Final diagnoses:   Opioid withdrawal ([address] )   Opioid use disorder   Anxiety     Time reflects when diagnosis was documented in both MDM as applicable and the Disposition within this note     Time User Action Codes Description Comment    10/3/2022  5:09 PM Benito Post Add [E17 35] Opioid withdrawal ([address] )     10/3/2022  5:16 PM Con Pont A Add [F11 90] Opioid use disorder     10/3/2022  5:33 PM Benito Post Add [F41 9] Anxiety       ED Disposition     ED Disposition   Discharge    Condition   Stable    Date/Time   Mon Oct 3, 2022  5:33 PM    Comment   Cecy Miss discharge to home/self care  Follow-up Information     Follow up With Specialties Details Why Contact Info Additional Information    [address] [facility] Emergency Department Emergency Medicine  As needed [address] [facility] [id number] [facility] Emergency Department, [address] [place], [place], [facility]          Discharge Medication List as of 10/3/2022  5:36 PM      START taking these medications    Details   buprenorphine-naloxone (Suboxone) 8-2 mg Place 1 Film (8 mg total) under the tongue 2 (two) times a day for 21 days, Starting Mon 10/3/2022, Until Mon 10/24/2022, Normal             No discharge procedures on file      PDMP Review       Value Time User    PDMP Reviewed  Yes 10/3/2022  5:31 PM Angi Quiroz MD          ED Provider  Electronically Signed by           Mahamed Duff MD  10/03/22 3095

## 2022-10-03 NOTE — DISCHARGE INSTRUCTIONS
Follow up with resources as provided and  the prescription for suboxone at  [facility] in [facility] is [address] [facility], [facility]

## 2022-10-03 NOTE — ED NOTES
Crisis spoke to the pt who appears anxious  Pt states he has been abusing Fentalyn  Pt states he has been feeling anxious  Pt denies SI/HI  Pt denies self harming  Pt denies hallucinations  Pt states he is looking for outpatient resources, to help him with his drug use  Crisis discussed treatment options  Pt refused Rehab/Detox but willing to accept outpatient resources  Pt given Drug and Alcohol resources as well as Mental Health resources

## 2022-10-03 NOTE — CONSULTS
TeleConsultation - Medical Toxicology  Gio Avalos 39 y o  male MRN: [id number]  Unit/Bed#: S1W7 Encounter: [id number]      REQUIRED DOCUMENTATION:     1  This service was provided via Telemedicine  2  Provider located at [place], [place]   3  TeleMed provider: Kelli Cerrato MD   4  Identify all parties in room with patient during tele consult:  Dr Hector Starr  5  After connecting through [employer], patient was identified by name and date of birth and assistant checked wristband  Patient was then informed that this was a Telemedicine visit and that the exam was being conducted confidentially over secure lines  My office door was closed  No one else was in the room  Patient acknowledged consent and understanding of privacy and security of the Telemedicine visit, and gave us permission to have the assistant stay in the room in order to assist with the history and to conduct the exam   I informed the patient that I have reviewed their record in Epic and presented the opportunity for them to ask any questions regarding the visit today  The patient agreed to participate  Consultation - Medical Toxicology  Gio Avalos 39 y o  male MRN: [id number]  Unit/Bed#: J5V7 Encounter: [id number]      Reason for Consult / Principal Problem: Opioid use disorder    Inpatient consult to Toxicology  Consult performed by: Kelli Cerrato MD  Consult ordered by: Jeanine Ely MD        10/03/22      ASSESSMENT:  1) Opioid use disorder  2) Benzodiazepine use  3) Anxiety  4) Back pain    DISCUSSION/ RECOMMENDATIONS:  Patient tolerated first dose of Suboxone, 8/2 mg without difficulty  He is still feeling some anxiety/ cravings and will get one more dose of Suboxone, 8/2 mg at this time  I am sending a three week bridging prescription for Suboxone, 8/2 mg BID  Please consult crisis and/or HOST to help the patient find a Suboxone prescriber in his area for continued follow-up      The patient is past the time-frame for benzodiazepine withdrawal, but his anxiety may be due in some part to post-acute withdrawal syndrome (PAWS)  He also reports underlying anxiety  Recommend follow-up with psychiatry as he may benefit from pharmacotherapy and/or counseling for his anxiety  Continued management of other medical issues per primary team       For further questions, please call [facility] or [facility] [address] to reach the medical  on call  Hx and PE limited by: Telehealth consult    HPI: Luis Malik is a 39y o  year old male who presented to the ED with complaints of back pain and anxiety  He noted that he has history of fentanyl use and had been on methadone, 60 mg daily up until 2 weeks ago  States he has not used any opioids since last dose of methadone  Patient was also previously on clonazepam, which he states he has not had for 2 weeks as well  He is experiencing anxiety, notes history of underlying anxiety but feels it is worse at this time  Also is feeling jittery  Patient is amenable to starting Suboxone  Denies history of liver problems  Denies being on any other medications  Review of Systems   Constitutional: Positive for appetite change  Musculoskeletal: Positive for back pain  Psychiatric/Behavioral: Positive for sleep disturbance  The patient is nervous/anxious  Historical Information   Past Medical History:   Diagnosis Date    Addiction to drug ([facility] [address] )     Anxiety     Psychiatric disorder      History reviewed  No pertinent surgical history  Social History   Social History     Substance and Sexual Activity   Alcohol Use Never     Social History     Substance and Sexual Activity   Drug Use Yes    Types: Marijuana, Methamphetamines     Social History     Tobacco Use   Smoking Status Current Every Day Smoker    Packs/day: 0 50   Smokeless Tobacco Never Used     History reviewed  No pertinent family history       Prior to Admission medications    Medication Sig Start Date End Date Taking? Authorizing Provider   buprenorphine-naloxone (Suboxone) 8-2 mg Place 1 Film (8 mg total) under the tongue 2 (two) times a day for 21 days 10/3/22 10/24/22 Yes Jayesh Muñoz MD       No current facility-administered medications for this encounter  Allergies   Allergen Reactions    Shellfish-Derived Products - Food Allergy Wheezing       Objective     No intake or output data in the 24 hours ending 10/03/22 1719    Invasive Devices:        Vitals   Vitals:    10/03/22 1511   BP: 168/87   TempSrc: Tympanic   Pulse: 67   Resp: 18   Patient Position - Orthostatic VS: Sitting   Temp: (!) 96 9 °F (36 1 °C)       Physical Exam  Vitals reviewed  Constitutional:       Appearance: He is not ill-appearing  HENT:      Head: Normocephalic  Mouth/Throat:      Mouth: Mucous membranes are moist    Eyes:      Conjunctiva/sclera: Conjunctivae normal    Cardiovascular:      Rate and Rhythm: Normal rate  Pulmonary:      Effort: Pulmonary effort is normal    Musculoskeletal:      Cervical back: Normal range of motion  Skin:     General: Skin is dry  Neurological:      Mental Status: He is alert  Psychiatric:      Comments: Appears mildly anxious         Counseling / Coordination of Care  Total floor / unit time spent today 31 minutes  Greater than 50% of total time was spent with the patient and / or family counseling and / or coordination of care

## 2022-11-09 ENCOUNTER — HOSPITAL ENCOUNTER (INPATIENT)
Dept: HOSPITAL 74 - YASAS | Age: 37
LOS: 3 days | Discharge: LEFT BEFORE DISCHARGE | DRG: 770 | End: 2022-11-12
Attending: SURGERY | Admitting: ALLERGY & IMMUNOLOGY
Payer: COMMERCIAL

## 2022-11-09 VITALS — BODY MASS INDEX: 32.1 KG/M2

## 2022-11-09 DIAGNOSIS — F41.9: ICD-10-CM

## 2022-11-09 DIAGNOSIS — J45.20: ICD-10-CM

## 2022-11-09 DIAGNOSIS — Z91.013: ICD-10-CM

## 2022-11-09 DIAGNOSIS — F17.210: ICD-10-CM

## 2022-11-09 DIAGNOSIS — F12.10: ICD-10-CM

## 2022-11-09 DIAGNOSIS — L30.9: ICD-10-CM

## 2022-11-09 DIAGNOSIS — F32.A: ICD-10-CM

## 2022-11-09 DIAGNOSIS — F11.23: Primary | ICD-10-CM

## 2022-11-09 DIAGNOSIS — I10: ICD-10-CM

## 2022-11-09 LAB
ALBUMIN SERPL-MCNC: 3.4 G/DL (ref 3.4–5)
ALP SERPL-CCNC: 60 U/L (ref 45–117)
ALT SERPL-CCNC: 17 U/L (ref 13–61)
ANION GAP SERPL CALC-SCNC: 4 MMOL/L (ref 8–16)
AST SERPL-CCNC: 15 U/L (ref 15–37)
BILIRUB SERPL-MCNC: 0.4 MG/DL (ref 0.2–1)
BUN SERPL-MCNC: 6.7 MG/DL (ref 7–18)
CALCIUM SERPL-MCNC: 8.7 MG/DL (ref 8.5–10.1)
CHLORIDE SERPL-SCNC: 109 MMOL/L (ref 98–107)
CO2 SERPL-SCNC: 32 MMOL/L (ref 21–32)
CREAT SERPL-MCNC: 0.9 MG/DL (ref 0.55–1.3)
DEPRECATED RDW RBC AUTO: 12.6 % (ref 11.9–15.9)
GLUCOSE SERPL-MCNC: 109 MG/DL (ref 74–106)
HCT VFR BLD CALC: 35.9 % (ref 35.4–49)
HGB BLD-MCNC: 12.1 GM/DL (ref 11.7–16.9)
MCH RBC QN AUTO: 29.3 PG (ref 25.7–33.7)
MCHC RBC AUTO-ENTMCNC: 33.8 G/DL (ref 32–35.9)
MCV RBC: 86.6 FL (ref 80–96)
PLATELET # BLD AUTO: 211 10^3/UL (ref 134–434)
PMV BLD: 9.9 FL (ref 7.5–11.1)
PROT SERPL-MCNC: 5.9 G/DL (ref 6.4–8.2)
RBC # BLD AUTO: 4.14 M/MM3 (ref 4–5.6)
SODIUM SERPL-SCNC: 145 MMOL/L (ref 136–145)
WBC # BLD AUTO: 4.5 K/MM3 (ref 4–10)

## 2022-11-09 PROCEDURE — HZ2ZZZZ DETOXIFICATION SERVICES FOR SUBSTANCE ABUSE TREATMENT: ICD-10-PCS | Performed by: SURGERY

## 2022-11-09 PROCEDURE — U0005 INFEC AGEN DETEC AMPLI PROBE: HCPCS

## 2022-11-09 PROCEDURE — U0003 INFECTIOUS AGENT DETECTION BY NUCLEIC ACID (DNA OR RNA); SEVERE ACUTE RESPIRATORY SYNDROME CORONAVIRUS 2 (SARS-COV-2) (CORONAVIRUS DISEASE [COVID-19]), AMPLIFIED PROBE TECHNIQUE, MAKING USE OF HIGH THROUGHPUT TECHNOLOGIES AS DESCRIBED BY CMS-2020-01-R: HCPCS

## 2022-11-09 RX ADMIN — Medication SCH MG: at 22:27

## 2022-11-09 RX ADMIN — HYDROXYZINE PAMOATE PRN MG: 25 CAPSULE ORAL at 22:28

## 2022-11-09 RX ADMIN — HYDROCORTISONE SCH APPLIC: 1 CREAM TOPICAL at 22:39

## 2022-11-10 RX ADMIN — NICOTINE PRN MG: 4 INHALANT RESPIRATORY (INHALATION) at 20:58

## 2022-11-10 RX ADMIN — Medication SCH MG: at 22:20

## 2022-11-10 RX ADMIN — Medication SCH TAB: at 10:33

## 2022-11-10 RX ADMIN — HYDROCORTISONE SCH APPLIC: 1 CREAM TOPICAL at 22:19

## 2022-11-10 RX ADMIN — HYDROXYZINE PAMOATE PRN MG: 25 CAPSULE ORAL at 22:20

## 2022-11-10 RX ADMIN — IBUPROFEN PRN MG: 600 TABLET, FILM COATED ORAL at 22:21

## 2022-11-10 RX ADMIN — HYDROCORTISONE SCH APPLIC: 1 CREAM TOPICAL at 10:33

## 2022-11-10 RX ADMIN — Medication SCH MG: at 22:19

## 2022-11-10 RX ADMIN — NICOTINE POLACRILEX PRN MG: 2 GUM, CHEWING ORAL at 20:59

## 2022-11-11 RX ADMIN — HYDROCORTISONE SCH APPLIC: 1 CREAM TOPICAL at 10:14

## 2022-11-11 RX ADMIN — HYDROXYZINE PAMOATE PRN MG: 25 CAPSULE ORAL at 22:36

## 2022-11-11 RX ADMIN — NICOTINE PRN MG: 4 INHALANT RESPIRATORY (INHALATION) at 23:38

## 2022-11-11 RX ADMIN — Medication SCH TAB: at 10:13

## 2022-11-11 RX ADMIN — Medication SCH MG: at 22:34

## 2022-11-11 RX ADMIN — IBUPROFEN PRN MG: 600 TABLET, FILM COATED ORAL at 22:39

## 2022-11-11 RX ADMIN — HYDROCORTISONE SCH APPLIC: 1 CREAM TOPICAL at 22:40

## 2022-11-11 RX ADMIN — NICOTINE POLACRILEX PRN MG: 2 GUM, CHEWING ORAL at 23:39

## 2022-11-11 RX ADMIN — IBUPROFEN PRN MG: 600 TABLET, FILM COATED ORAL at 05:48

## 2022-11-12 VITALS — SYSTOLIC BLOOD PRESSURE: 126 MMHG | DIASTOLIC BLOOD PRESSURE: 64 MMHG | HEART RATE: 54 BPM | TEMPERATURE: 97.8 F

## 2022-11-12 VITALS — RESPIRATION RATE: 16 BRPM

## 2022-11-12 RX ADMIN — Medication SCH TAB: at 10:22

## 2022-11-12 RX ADMIN — HYDROCORTISONE SCH: 1 CREAM TOPICAL at 10:22
